# Patient Record
Sex: FEMALE | HISPANIC OR LATINO | Employment: FULL TIME | URBAN - NONMETROPOLITAN AREA
[De-identification: names, ages, dates, MRNs, and addresses within clinical notes are randomized per-mention and may not be internally consistent; named-entity substitution may affect disease eponyms.]

---

## 2021-03-08 ENCOUNTER — OFFICE VISIT (OUTPATIENT)
Dept: URGENT CARE | Facility: CLINIC | Age: 26
End: 2021-03-08
Payer: COMMERCIAL

## 2021-03-08 VITALS
HEIGHT: 64 IN | TEMPERATURE: 98.5 F | OXYGEN SATURATION: 99 % | HEART RATE: 79 BPM | BODY MASS INDEX: 32.95 KG/M2 | WEIGHT: 193 LBS | SYSTOLIC BLOOD PRESSURE: 104 MMHG | DIASTOLIC BLOOD PRESSURE: 64 MMHG | RESPIRATION RATE: 16 BRPM

## 2021-03-08 DIAGNOSIS — M54.50 LOW BACK PAIN WITH RADIATION: ICD-10-CM

## 2021-03-08 PROCEDURE — 99203 OFFICE O/P NEW LOW 30 MIN: CPT | Performed by: PHYSICIAN ASSISTANT

## 2021-03-08 RX ORDER — KETOROLAC TROMETHAMINE 30 MG/ML
30 INJECTION, SOLUTION INTRAMUSCULAR; INTRAVENOUS ONCE
Status: COMPLETED | OUTPATIENT
Start: 2021-03-08 | End: 2021-03-08

## 2021-03-08 RX ORDER — METHYLPREDNISOLONE 4 MG/1
TABLET ORAL
Qty: 21 TABLET | Refills: 0 | Status: SHIPPED | OUTPATIENT
Start: 2021-03-08 | End: 2021-07-26

## 2021-03-08 RX ORDER — CYCLOBENZAPRINE HCL 10 MG
10 TABLET ORAL 3 TIMES DAILY PRN
Qty: 30 TABLET | Refills: 0 | Status: SHIPPED | OUTPATIENT
Start: 2021-03-08 | End: 2021-07-26

## 2021-03-08 RX ADMIN — KETOROLAC TROMETHAMINE 30 MG: 30 INJECTION, SOLUTION INTRAMUSCULAR; INTRAVENOUS at 17:50

## 2021-03-08 ASSESSMENT — ENCOUNTER SYMPTOMS
MYALGIAS: 1
FEVER: 0
BACK PAIN: 1
WEAKNESS: 0
FALLS: 0
HEADACHES: 0
TINGLING: 0
CHILLS: 0
DIAPHORESIS: 0
PALPITATIONS: 0
DIZZINESS: 0

## 2021-03-08 NOTE — LETTER
[facility] URGENT CARE [facility]  [address] [address]     March 8, 2021    Patient: Thi Rubalcava   YOB: 1995   Date of Visit: 3/8/2021       To Whom It May Concern:    Thi Rubalcava was seen and treated in our department on 3/8/2021.  Please excuse from work on 3/9/2021 and 3/10/2021.  Patient sustained a low back strain with associated radiculopathy.  Please call with any questions or concerns at [phone].    Sincerely,     Alex Valadez P.A.-C.

## 2021-03-09 NOTE — PROGRESS NOTES
Subjective:   Thi Rubalcava is a 25 y.o. female who presents for Low Back Pain (excruciating pain on her lower back, no known injury happened 2 days ago)      HPI:  This is a very pleasant 25-year-old female presenting to the clinic with right-sided low back pain starting on Saturday.  Patient states she was vacuuming her house when she felt an immediate sharp twinge and has had continued pain.  She denies any direct trauma or fall.  Pain is described as a sharp stabbing pain to the right lower back and radicular symptoms down the right leg.  Lumbar flexion seem to aggravate the symptoms.  She denies any numbness or tingling down the legs.  She denies any fevers, chills or myalgias.  Denies any saddle paresthesias.  Denies any change in bowel or bladder function.  She has tried Tylenol and ibuprofen which provides mild improvement.     Review of Systems   Constitutional: Negative for chills, diaphoresis, fever and malaise/fatigue.   Cardiovascular: Negative for chest pain and palpitations.   Genitourinary: Negative for dysuria and frequency.   Musculoskeletal: Positive for back pain and myalgias. Negative for falls.   Neurological: Negative for dizziness, tingling, weakness and headaches.       Medications:    • ibuprofen Tabs  • PRENATAL COMPLETE PO    Allergies: Patient has no known allergies.    Problem List: Thi Rubalcava has Hx of diet controlled gestational diabetes mellitus in second trimester and Encounter for postpartum care of lactating mother on their problem list.    Surgical History:  No past surgical history on file.    Past Social Hx: Thi Rubalcava  reports that she has never smoked. She has never used smokeless tobacco. She reports that she does not drink alcohol and does not use drugs.     Past Family Hx:  Thi Rubalcava family history includes Cancer in her brother and paternal grandfather; Diabetes in her father, paternal aunt, paternal grandmother, and paternal  "uncle; Hypertension in her paternal aunt, paternal grandmother, and paternal uncle.     Problem list, medications, and allergies reviewed by myself today in Epic.     Objective:     /64   Pulse 79   Temp 36.9 °C (98.5 °F)   Resp 16   Ht 1.626 m (5' 4\")   Wt 87.5 kg (193 lb)   LMP 08/09/2015 (Exact Date)   SpO2 99%   BMI 33.13 kg/m²     Physical Exam  Constitutional:       General: She is not in acute distress.     Appearance: Normal appearance. She is not ill-appearing, toxic-appearing or diaphoretic.   HENT:      Head: Normocephalic and atraumatic.   Eyes:      General:         Left eye: Left eye discharge: medrol.     Conjunctiva/sclera: Conjunctivae normal.   Cardiovascular:      Rate and Rhythm: Normal rate and regular rhythm.      Pulses: Normal pulses.      Heart sounds: Normal heart sounds.   Musculoskeletal:      Cervical back: Normal range of motion.      Comments: Lumbar exam:  No midline tenderness to palpation.  No obvious deformities or step-offs.  Mild right-sided paraspinal muscle tenderness to palpation.  Patient has limited lumbar range of motion in all directions due to pain.  Positive right-sided straight leg raise at 10 degrees.  Bilateral lower extremity sensation intact.  Normal gait.   Neurological:      General: No focal deficit present.      Mental Status: She is alert and oriented to person, place, and time. Mental status is at baseline.           Assessment/Plan:     Diagnosis and associated orders:   1. Low back pain with radiation    - methylPREDNISolone (MEDROL DOSEPAK) 4 MG Tablet Therapy Pack; Follow schedule on package instructions.  Dispense: 21 tablet; Refill: 0  - cyclobenzaprine (FLEXERIL) 10 mg Tab; Take 1 tablet by mouth 3 times a day as needed.  Dispense: 30 tablet; Refill: 0  - ketorolac (TORADOL) injection 30 mg       Comments/MDM:     Take medication as prescribed. Discussed sedative effects of muscle relaxers.  Can take OTC Tylenol as directed for pain. "   Temperature Therapy: Heat or ice, whichever feels better.  Gentle ROM back stretches and exercises. Resume activity as tolerated.  Follow up with your primary care doctor.  • Follow up for persistent, new, or worsening pain. Emergently for weakness, loss of bowel or bladder, groin pain or numbness, fevers.           Differential diagnosis, natural history, supportive care, and indications for immediate follow-up discussed.    Advised the patient to follow-up with the primary care physician for recheck, reevaluation, and consideration of further management.    Please note that this dictation was created using voice recognition software. I have made reasonable attempt to correct obvious errors, but I expect that there are errors of grammar and possibly content that I did not discover before finalizing the note.    This note was electronically signed by YING Valadez PA-C

## 2021-07-26 ENCOUNTER — OFFICE VISIT (OUTPATIENT)
Dept: URGENT CARE | Facility: CLINIC | Age: 26
End: 2021-07-26
Payer: COMMERCIAL

## 2021-07-26 VITALS
DIASTOLIC BLOOD PRESSURE: 60 MMHG | HEART RATE: 88 BPM | WEIGHT: 182 LBS | HEIGHT: 64 IN | SYSTOLIC BLOOD PRESSURE: 102 MMHG | TEMPERATURE: 98.8 F | RESPIRATION RATE: 16 BRPM | BODY MASS INDEX: 31.07 KG/M2 | OXYGEN SATURATION: 94 %

## 2021-07-26 DIAGNOSIS — G44.89 OTHER HEADACHE SYNDROME: ICD-10-CM

## 2021-07-26 DIAGNOSIS — R09.81 NASAL CONGESTION: ICD-10-CM

## 2021-07-26 DIAGNOSIS — J02.9 SORE THROAT: ICD-10-CM

## 2021-07-26 DIAGNOSIS — B34.9 VIRAL ILLNESS: ICD-10-CM

## 2021-07-26 LAB
INT CON NEG: NEGATIVE
INT CON POS: POSITIVE
S PYO AG THROAT QL: NEGATIVE

## 2021-07-26 PROCEDURE — 87880 STREP A ASSAY W/OPTIC: CPT | Performed by: NURSE PRACTITIONER

## 2021-07-26 PROCEDURE — 99213 OFFICE O/P EST LOW 20 MIN: CPT | Performed by: NURSE PRACTITIONER

## 2021-07-26 ASSESSMENT — ENCOUNTER SYMPTOMS
GASTROINTESTINAL NEGATIVE: 1
CARDIOVASCULAR NEGATIVE: 1
PSYCHIATRIC NEGATIVE: 1
SORE THROAT: 1
TROUBLE SWALLOWING: 1
CONSTITUTIONAL NEGATIVE: 1
EYES NEGATIVE: 1
MUSCULOSKELETAL NEGATIVE: 1
RESPIRATORY NEGATIVE: 1
HEADACHES: 1
SWOLLEN GLANDS: 1

## 2021-07-26 NOTE — LETTER
July 26, 2021    To Whom It May Concern:         This is confirmation that Thi Rubalcava attended her scheduled appointment with BRIANA Plata on 7/26/21.         If you have any questions please do not hesitate to call me at the phone number listed below.    Sincerely,          Pretty Cabrera, Med Ass't  [phone]

## 2021-07-26 NOTE — LETTER
July 26, 2021    To Whom It May Concern:         This is confirmation that Thi Rubalcava attended her scheduled appointment with BRIANA Plata on 7/26/21. Please excuse her from work on the dates of 7/26 and 7/27.         If you have any questions please do not hesitate to call me at the phone number listed below.    Sincerely,          RAMONA PlataRSujathaN.  [phone]

## 2021-07-27 NOTE — PROGRESS NOTES
"Subjective:   Thi Rubalcava is a 25 y.o. female who presents for Pharyngitis (X 1 day), Nasal Congestion (X 1 day), and Headache (X 1 day)       Pharyngitis   This is a new problem. The current episode started today. The problem has been unchanged. The pain is worse on the right side. There has been no fever. The pain is moderate. Associated symptoms include congestion, headaches, swollen glands and trouble swallowing. She has tried NSAIDs and cool liquids (hot tea) for the symptoms. The treatment provided moderate relief.         Review of Systems   Constitutional: Negative.    HENT: Positive for congestion, sore throat and trouble swallowing.    Eyes: Negative.    Respiratory: Negative.    Cardiovascular: Negative.    Gastrointestinal: Negative.    Genitourinary: Negative.    Musculoskeletal: Negative.    Skin: Negative.    Neurological: Positive for headaches.   Psychiatric/Behavioral: Negative.    All other systems reviewed and are negative.      MEDS: No current outpatient medications on file.  ALLERGIES: No Known Allergies    Patient's PMH, SocHx, SurgHx, FamHx, Drug allergies and medications were reviewed.     Objective:   /60 (BP Location: Right arm, Patient Position: Sitting, BP Cuff Size: Adult)   Pulse 88   Temp 37.1 °C (98.8 °F) (Temporal)   Resp 16   Ht 1.626 m (5' 4\")   Wt 82.6 kg (182 lb)   LMP 08/09/2015 (Exact Date)   SpO2 94%   BMI 31.24 kg/m²     Physical Exam  Vitals and nursing note reviewed.   Constitutional:       General: She is awake.      Appearance: Normal appearance. She is well-developed and normal weight.   HENT:      Head: Normocephalic and atraumatic.      Right Ear: Tympanic membrane, ear canal and external ear normal.      Left Ear: Tympanic membrane, ear canal and external ear normal.      Nose: Nose normal.      Mouth/Throat:      Lips: Pink.      Mouth: Mucous membranes are moist.      Pharynx: Oropharynx is clear. Uvula midline.   Eyes:      Extraocular " Movements: Extraocular movements intact.      Conjunctiva/sclera: Conjunctivae normal.      Pupils: Pupils are equal, round, and reactive to light.   Neck:      Thyroid: No thyromegaly.      Trachea: Trachea normal.   Cardiovascular:      Rate and Rhythm: Normal rate and regular rhythm.      Pulses: Normal pulses.      Heart sounds: Normal heart sounds, S1 normal and S2 normal.   Pulmonary:      Effort: Pulmonary effort is normal. No respiratory distress.      Breath sounds: Normal breath sounds. No wheezing, rhonchi or rales.   Abdominal:      General: Bowel sounds are normal.      Palpations: Abdomen is soft.   Musculoskeletal:         General: Normal range of motion.      Cervical back: Full passive range of motion without pain, normal range of motion and neck supple.   Lymphadenopathy:      Cervical: No cervical adenopathy.   Skin:     General: Skin is warm and dry.      Capillary Refill: Capillary refill takes less than 2 seconds.   Neurological:      General: No focal deficit present.      Mental Status: She is alert and oriented to person, place, and time.      Gait: Gait is intact.   Psychiatric:         Attention and Perception: Attention and perception normal.         Mood and Affect: Mood normal.         Speech: Speech normal.         Behavior: Behavior normal. Behavior is cooperative.         Thought Content: Thought content normal.         Judgment: Judgment normal.         Assessment/Plan:   Assessment    1. Viral illness, suspected    2. Sore throat  - POCT Rapid Strep A    3. Nasal congestion  - POCT Rapid Strep A    4. Other headache syndrome  - POCT Rapid Strep A    Vital signs stable at today's acute urgent care visit. Reviewed test results that were completed in the clinic, negative for group A strep.  Patient is fully covered vaccinated, low suspicion for Covid at this time.  Discussed management options (risks, benefits, and alternatives to treatment).     Advised the patient to follow-up with  the primary care provider for recheck, reevaluation, and/or consideration of further management if necessary. Return to urgent care with any worsening symptoms or if there is no improvement in their current condition. Red flags discussed and indications to immediately call 911 or present to the ED.  All questions were encouraged and answered to the patient's satisfaction and understanding, and they agree to the plan of care.     I personally reviewed prior external notes and test results pertinent to today's visit.  I have independently reviewed and interpreted all diagnostics ordered during this urgent care acute visit. Time spent evaluating this patient was a minimum of 30 minutes and includes preparing for visit, counseling/education, exam, evaluation, obtaining history, and ordering lab/test/procedures.      Please note that this dictation was created using voice recognition software. I have made a reasonable attempt to correct obvious errors, but I expect that there are errors of grammar and possibly content that I did not discover before finalizing the note.

## 2021-07-29 ENCOUNTER — HOSPITAL ENCOUNTER (OUTPATIENT)
Facility: MEDICAL CENTER | Age: 26
End: 2021-07-29
Attending: PHYSICIAN ASSISTANT
Payer: COMMERCIAL

## 2021-07-29 ENCOUNTER — OFFICE VISIT (OUTPATIENT)
Dept: URGENT CARE | Facility: CLINIC | Age: 26
End: 2021-07-29
Payer: COMMERCIAL

## 2021-07-29 VITALS
HEIGHT: 64 IN | TEMPERATURE: 97.9 F | SYSTOLIC BLOOD PRESSURE: 112 MMHG | BODY MASS INDEX: 31.02 KG/M2 | OXYGEN SATURATION: 95 % | DIASTOLIC BLOOD PRESSURE: 68 MMHG | HEART RATE: 79 BPM | RESPIRATION RATE: 16 BRPM | WEIGHT: 181.7 LBS

## 2021-07-29 DIAGNOSIS — R05.9 COUGH: ICD-10-CM

## 2021-07-29 DIAGNOSIS — J98.8 VIRAL RESPIRATORY ILLNESS: ICD-10-CM

## 2021-07-29 DIAGNOSIS — B97.89 VIRAL RESPIRATORY ILLNESS: ICD-10-CM

## 2021-07-29 PROCEDURE — 99214 OFFICE O/P EST MOD 30 MIN: CPT | Performed by: PHYSICIAN ASSISTANT

## 2021-07-29 PROCEDURE — U0003 INFECTIOUS AGENT DETECTION BY NUCLEIC ACID (DNA OR RNA); SEVERE ACUTE RESPIRATORY SYNDROME CORONAVIRUS 2 (SARS-COV-2) (CORONAVIRUS DISEASE [COVID-19]), AMPLIFIED PROBE TECHNIQUE, MAKING USE OF HIGH THROUGHPUT TECHNOLOGIES AS DESCRIBED BY CMS-2020-01-R: HCPCS

## 2021-07-29 RX ORDER — BENZONATATE 100 MG/1
200 CAPSULE ORAL 3 TIMES DAILY PRN
Qty: 60 CAPSULE | Refills: 0 | Status: SHIPPED | OUTPATIENT
Start: 2021-07-29

## 2021-07-29 ASSESSMENT — ENCOUNTER SYMPTOMS
VOMITING: 0
COUGH: 1
SPUTUM PRODUCTION: 0
SHORTNESS OF BREATH: 1
DIARRHEA: 0
FEVER: 0
CHILLS: 0
NAUSEA: 0
MYALGIAS: 1
SORE THROAT: 1
WEAKNESS: 1
WHEEZING: 0

## 2021-07-29 NOTE — LETTER
July 29, 2021         Patient: Thi Rubalcava   YOB: 1995   Date of Visit: 7/29/2021           To Whom it May Concern:    Concern for COVID-19 illness has been identified and testing is in progress.  The results are available through our electronic delivery system called [email or web address].    We are asking employers to excuse absence while they follow self isolation protocol per CDC guidelines    • At least 10 days since symptoms first appeared AND  • At least 24 hours with no fever greater than 100.4 without fever reducing medication AND  • Symptoms have improved.     If results are negative, you must continue to follow the self-isolation protocol. You may return to work when you have no fever for at least 24 hours without the use of fever-reducing medication, and symptoms have improved.     If the results of testing are positive then you will be contacted by your [facility] department for further instructions on duration of self-isolation and return to work. In general, this will also follow the CDC guidelines with minimum of 10 days from the onset of symptoms, and symptoms are improving without fever.       This is the only note that will be provided from [facility] for this visit. Please schedule a visit with a primary care provider if FMLA, disability, or unemployment paperwork is required.       Sincerely,    Xuan Montoya, P.A.-C.  [phone]            Electronically Signed

## 2021-07-30 NOTE — PATIENT INSTRUCTIONS
INSTRUCTIONS FOR COVID-19 OR ANY OTHER INFECTIOUS RESPIRATORY ILLNESSES    The Centers for Disease Control and Prevention (CDC) states that early indications for COVID-19 include cough, shortness of breath, difficulty breathing, or at least two of the following symptoms: chills, shaking with chills, muscle pain, headache, sore throat, and loss of taste or smell. Symptoms can range from mild to severe and may appear up to two weeks after exposure to the virus.    The practice of self-isolation and quarantine helps protect the public and your family by  preventing exposure to people who have or may have a contagious disease. Please follow the prevention steps below as based on CDC guidelines:    WHEN TO STOP ISOLATION: Persons with COVID-19 or any other infectious respiratory illness who have symptoms and were advised to care for themselves at home may discontinue home isolation under the following conditions:  · At least 24 hours have passed since recovery defined as resolution of fever without the use of fever-reducing medications; AND,  · Improvement in respiratory symptoms (e.g., cough, shortness of breath); AND,  · At least 10 days have passed since symptoms first appeared and have had no subsequent illness.    MONITOR YOUR SYMPTOMS: If your illness is worsening, seek prompt medical attention. If you have a medical emergency and need to call 911, notify the dispatch personnel that you have, or are being evaluated for confirmed or suspected COVID-19 or another infectious respiratory illness. Wear a facemask if possible.    ACTIVITY RESTRICTION: restrict activities outside your home, except for getting medical care. Do not go to work, school, or public areas. Avoid using public transportation, ride-sharing, or taxis.    SCHEDULED MEDICAL APPOINTMENTS: Notify your provider that you have, or are being evaluated for, confirmed or suspected COVID-19 or another infectious respiratory. This will help the healthcare  provider’s office safely take care of you and keep other people from getting exposed or infected.    FACEMASKS, when to wear: Anytime you are away from your home or around other people or pets. If you are unable to wear one, maintain a minimum of 6 feet distancing from others.    LIVING ENVIRONMENT: Stay in a separate room from other people and pets. If possible, use a separate bathroom, have someone else care for your pets and avoid sharing household items. Any items used should be washed thoroughly with soap and water. Clean all “high-touch” surfaces every day. Use a household cleaning spray or wipe, according to the label instructions. High touch surfaces include (but are not limited to) counters, tabletops, doorknobs, bathroom fixtures, toilets, phones, keyboards, tablets, and bedside tables.     HAND WASHING: Frequently wash hands with soap and water for at least 20 seconds,  especially after blowing your nose, coughing, or sneezing; going to the bathroom; before and after interacting with pets; and before and after eating or preparing food. If hands are visibly dirty use soap and water. If soap and water are not available, use an alcohol-based hand  with at least 60% alcohol. Avoid touching your eyes, nose, and mouth with unwashed hands. Cover your coughs and sneezes with a tissue. Throw used tissues in a lined trash can. Immediately wash your hands.    ACTIVE/FACILITATED SELF-MONITORING: Follow instructions provided by your local health department or health professionals, as appropriate. When working with your local health department check their available hours.    [facility]   Phone Number   [facility] [phone]   [facility] [place] [phone]   [place] [address]   [place] [phone]     IF YOU HAVE CONFIRMED POSITIVE COVID-19:    Those who have completely recovered from COVID-19 may have immune-boosting antibodies in their plasma--called “convalescent plasma”--that could be  used to treat critically ill COVID19 patients.    [facility] is excited to begin working with [facility] on collecting convalescent plasma from  people who have recovered from COVID-19 as part of a program to treat patients infected with the virus. This FDA-approved “emergency investigational new drug” is a special blood product containing antibodies that may give patients an extra boost to fight the virus.    To be eligible to donate convalescent plasma, you must have a prior COVID-19 diagnosis documented by a laboratory test (or a positive test result for SARS-CoV-2 antibodies) and meet additional eligibility requirements.    If you are interested in donating convalescent plasma or have any additional questions, please contact the [facility] Convalescent Plasma  at [phone] or via e-mail at [email or web address].

## 2021-07-31 DIAGNOSIS — B97.89 VIRAL RESPIRATORY ILLNESS: ICD-10-CM

## 2021-07-31 DIAGNOSIS — R05.9 COUGH: ICD-10-CM

## 2021-07-31 DIAGNOSIS — J98.8 VIRAL RESPIRATORY ILLNESS: ICD-10-CM

## 2021-07-31 LAB — COVID ORDER STATUS COVID19: NORMAL

## 2021-08-01 LAB
SARS-COV-2 RNA RESP QL NAA+PROBE: NOTDETECTED
SPECIMEN SOURCE: NORMAL

## 2025-07-04 ENCOUNTER — APPOINTMENT (OUTPATIENT)
Dept: RADIOLOGY | Facility: MEDICAL CENTER | Age: 30
End: 2025-07-04
Attending: EMERGENCY MEDICINE

## 2025-07-04 ENCOUNTER — HOSPITAL ENCOUNTER (EMERGENCY)
Facility: MEDICAL CENTER | Age: 30
End: 2025-07-04
Attending: EMERGENCY MEDICINE

## 2025-07-04 VITALS
RESPIRATION RATE: 16 BRPM | HEIGHT: 63 IN | TEMPERATURE: 97 F | SYSTOLIC BLOOD PRESSURE: 119 MMHG | DIASTOLIC BLOOD PRESSURE: 71 MMHG | HEART RATE: 65 BPM | OXYGEN SATURATION: 96 % | BODY MASS INDEX: 32.54 KG/M2 | WEIGHT: 183.64 LBS

## 2025-07-04 DIAGNOSIS — R20.2 PARESTHESIA: Primary | ICD-10-CM

## 2025-07-04 DIAGNOSIS — G43.809 OTHER MIGRAINE WITHOUT STATUS MIGRAINOSUS, NOT INTRACTABLE: ICD-10-CM

## 2025-07-04 DIAGNOSIS — H53.9 VISUAL DISTURBANCE: ICD-10-CM

## 2025-07-04 LAB
ABO GROUP BLD: NORMAL
ALBUMIN SERPL BCP-MCNC: 4.1 G/DL (ref 3.2–4.9)
ALBUMIN/GLOB SERPL: 1.5 G/DL
ALP SERPL-CCNC: 45 U/L (ref 30–99)
ALT SERPL-CCNC: 22 U/L (ref 2–50)
ANION GAP SERPL CALC-SCNC: 11 MMOL/L (ref 7–16)
APTT PPP: 29.7 SEC (ref 24.7–36)
AST SERPL-CCNC: 17 U/L (ref 12–45)
BASOPHILS # BLD AUTO: 0.9 % (ref 0–1.8)
BASOPHILS # BLD: 0.06 K/UL (ref 0–0.12)
BILIRUB SERPL-MCNC: 0.3 MG/DL (ref 0.1–1.5)
BLD GP AB SCN SERPL QL: NORMAL
BUN SERPL-MCNC: 15 MG/DL (ref 8–22)
CALCIUM ALBUM COR SERPL-MCNC: 8.8 MG/DL (ref 8.5–10.5)
CALCIUM SERPL-MCNC: 8.9 MG/DL (ref 8.5–10.5)
CHLORIDE SERPL-SCNC: 106 MMOL/L (ref 96–112)
CO2 SERPL-SCNC: 20 MMOL/L (ref 20–33)
CREAT SERPL-MCNC: 0.82 MG/DL (ref 0.5–1.4)
EKG IMPRESSION: NORMAL
EOSINOPHIL # BLD AUTO: 0.08 K/UL (ref 0–0.51)
EOSINOPHIL NFR BLD: 1.2 % (ref 0–6.9)
ERYTHROCYTE [DISTWIDTH] IN BLOOD BY AUTOMATED COUNT: 42.2 FL (ref 35.9–50)
GFR SERPLBLD CREATININE-BSD FMLA CKD-EPI: 98 ML/MIN/1.73 M 2
GLOBULIN SER CALC-MCNC: 2.8 G/DL (ref 1.9–3.5)
GLUCOSE BLD STRIP.AUTO-MCNC: 93 MG/DL (ref 65–99)
GLUCOSE SERPL-MCNC: 84 MG/DL (ref 65–99)
HCG SERPL QL: NEGATIVE
HCT VFR BLD AUTO: 39.5 % (ref 37–47)
HGB BLD-MCNC: 13.7 G/DL (ref 12–16)
IMM GRANULOCYTES # BLD AUTO: 0.01 K/UL (ref 0–0.11)
IMM GRANULOCYTES NFR BLD AUTO: 0.1 % (ref 0–0.9)
INR PPP: 1.02 (ref 0.87–1.13)
LYMPHOCYTES # BLD AUTO: 2.46 K/UL (ref 1–4.8)
LYMPHOCYTES NFR BLD: 36.6 % (ref 22–41)
MCH RBC QN AUTO: 32.2 PG (ref 27–33)
MCHC RBC AUTO-ENTMCNC: 34.7 G/DL (ref 32.2–35.5)
MCV RBC AUTO: 92.7 FL (ref 81.4–97.8)
MONOCYTES # BLD AUTO: 0.5 K/UL (ref 0–0.85)
MONOCYTES NFR BLD AUTO: 7.4 % (ref 0–13.4)
NEUTROPHILS # BLD AUTO: 3.62 K/UL (ref 1.82–7.42)
NEUTROPHILS NFR BLD: 53.8 % (ref 44–72)
NRBC # BLD AUTO: 0 K/UL
NRBC BLD-RTO: 0 /100 WBC (ref 0–0.2)
PLATELET # BLD AUTO: 290 K/UL (ref 164–446)
PMV BLD AUTO: 10.4 FL (ref 9–12.9)
POTASSIUM SERPL-SCNC: 4.4 MMOL/L (ref 3.6–5.5)
PROT SERPL-MCNC: 6.9 G/DL (ref 6–8.2)
PROTHROMBIN TIME: 13.4 SEC (ref 12–14.6)
RBC # BLD AUTO: 4.26 M/UL (ref 4.2–5.4)
RH BLD: NORMAL
SODIUM SERPL-SCNC: 137 MMOL/L (ref 135–145)
TROPONIN T SERPL-MCNC: <6 NG/L (ref 6–19)
WBC # BLD AUTO: 6.7 K/UL (ref 4.8–10.8)

## 2025-07-04 PROCEDURE — 86850 RBC ANTIBODY SCREEN: CPT

## 2025-07-04 PROCEDURE — 99284 EMERGENCY DEPT VISIT MOD MDM: CPT

## 2025-07-04 PROCEDURE — 70498 CT ANGIOGRAPHY NECK: CPT

## 2025-07-04 PROCEDURE — 85610 PROTHROMBIN TIME: CPT

## 2025-07-04 PROCEDURE — 0042T CT-CEREBRAL PERFUSION ANALYSIS: CPT

## 2025-07-04 PROCEDURE — 70496 CT ANGIOGRAPHY HEAD: CPT

## 2025-07-04 PROCEDURE — 80053 COMPREHEN METABOLIC PANEL: CPT

## 2025-07-04 PROCEDURE — 700111 HCHG RX REV CODE 636 W/ 250 OVERRIDE (IP): Mod: JZ | Performed by: EMERGENCY MEDICINE

## 2025-07-04 PROCEDURE — 82962 GLUCOSE BLOOD TEST: CPT

## 2025-07-04 PROCEDURE — 71045 X-RAY EXAM CHEST 1 VIEW: CPT

## 2025-07-04 PROCEDURE — 85730 THROMBOPLASTIN TIME PARTIAL: CPT

## 2025-07-04 PROCEDURE — 85025 COMPLETE CBC W/AUTO DIFF WBC: CPT

## 2025-07-04 PROCEDURE — 86901 BLOOD TYPING SEROLOGIC RH(D): CPT

## 2025-07-04 PROCEDURE — 96374 THER/PROPH/DIAG INJ IV PUSH: CPT

## 2025-07-04 PROCEDURE — 86900 BLOOD TYPING SEROLOGIC ABO: CPT

## 2025-07-04 PROCEDURE — 93005 ELECTROCARDIOGRAM TRACING: CPT | Mod: TC | Performed by: EMERGENCY MEDICINE

## 2025-07-04 PROCEDURE — 84484 ASSAY OF TROPONIN QUANT: CPT

## 2025-07-04 PROCEDURE — 700117 HCHG RX CONTRAST REV CODE 255: Performed by: EMERGENCY MEDICINE

## 2025-07-04 PROCEDURE — 84703 CHORIONIC GONADOTROPIN ASSAY: CPT

## 2025-07-04 PROCEDURE — 96375 TX/PRO/DX INJ NEW DRUG ADDON: CPT

## 2025-07-04 PROCEDURE — 36415 COLL VENOUS BLD VENIPUNCTURE: CPT

## 2025-07-04 RX ORDER — DEXAMETHASONE SODIUM PHOSPHATE 4 MG/ML
10 INJECTION, SOLUTION INTRA-ARTICULAR; INTRALESIONAL; INTRAMUSCULAR; INTRAVENOUS; SOFT TISSUE ONCE
Status: COMPLETED | OUTPATIENT
Start: 2025-07-04 | End: 2025-07-04

## 2025-07-04 RX ORDER — DIPHENHYDRAMINE HYDROCHLORIDE 50 MG/ML
12.5 INJECTION, SOLUTION INTRAMUSCULAR; INTRAVENOUS ONCE
Status: COMPLETED | OUTPATIENT
Start: 2025-07-04 | End: 2025-07-04

## 2025-07-04 RX ORDER — METOCLOPRAMIDE HYDROCHLORIDE 5 MG/ML
10 INJECTION INTRAMUSCULAR; INTRAVENOUS ONCE
Status: COMPLETED | OUTPATIENT
Start: 2025-07-04 | End: 2025-07-04

## 2025-07-04 RX ADMIN — DEXAMETHASONE SODIUM PHOSPHATE 10 MG: 4 INJECTION, SOLUTION INTRAMUSCULAR; INTRAVENOUS at 12:53

## 2025-07-04 RX ADMIN — DIPHENHYDRAMINE HYDROCHLORIDE 12.5 MG: 50 INJECTION, SOLUTION INTRAMUSCULAR; INTRAVENOUS at 12:54

## 2025-07-04 RX ADMIN — METOCLOPRAMIDE HYDROCHLORIDE 10 MG: 5 INJECTION INTRAMUSCULAR; INTRAVENOUS at 12:58

## 2025-07-04 RX ADMIN — IOHEXOL 120 ML: 350 INJECTION, SOLUTION INTRAVENOUS at 14:00

## 2025-07-04 ASSESSMENT — LIFESTYLE VARIABLES
HAVE PEOPLE ANNOYED YOU BY CRITICIZING YOUR DRINKING: NO
EVER FELT BAD OR GUILTY ABOUT YOUR DRINKING: NO
CONSUMPTION TOTAL: INCOMPLETE
TOTAL SCORE: 0
HAVE YOU EVER FELT YOU SHOULD CUT DOWN ON YOUR DRINKING: NO
DO YOU DRINK ALCOHOL: NO
TOTAL SCORE: 0
TOTAL SCORE: 0
EVER HAD A DRINK FIRST THING IN THE MORNING TO STEADY YOUR NERVES TO GET RID OF A HANGOVER: NO

## 2025-07-04 NOTE — DISCHARGE INSTRUCTIONS
You are leaving the hospital today against physician advice as you are declining further evaluation with MRI scan to be sure that you did not have a small stroke.  You are welcome to return anytime if you change your mind about admission and completing the rest of the workup.    Please follow-up with your neurologist on Monday.  Call first thing Monday morning to schedule an appointment.    Return to the ER immediately for any recurrent vision changes, recurrent numbness or tingling to your extremities, weakness of your extremities, recurrent headache, double vision, vertigo, facial drooping, slurred speech, difficulty walking, chest pain, shortness of breath, or for any concerns.    Drink plenty of fluids to stay well-hydrated.

## 2025-07-04 NOTE — ED PROVIDER NOTES
"ED Provider Note    CHIEF COMPLAINT  Chief Complaint   Patient presents with    Numbness     Patient presents for left monocular vision changes as well as numbness and tingling to left arm. Seen three years ago for same complaint with diagnosis of migraines.        EXTERNAL RECORDS REVIEWED  External ED Note patient was seen at [facility] ER September 8, 2022 complaining of left eye vision loss,and left upper extremity weakness.  She reported some tingling to the entire left arm from the left shoulder down to her fingers.  During that visit patient reported a frontal headache which was similar to her migraine headaches.  She underwent a teleneurology visit and plan was to proceed with CT/CTA and MRI.  The patient was given a migraine cocktail with significant improvement. The CTA of the head and neck done at [facility] during that ER visit was negative.  She also had an MRI of the brain performed during that ER visit and the MRI was read as  \"no acute intracranial abnormalities.\"  Patient was discharged from the ER with a diagnosis of suspected complex migraine headache.    Patient followed up with Dr. Phillips, neurologist, in December 2022 and again in March 2023.  Neurologist felt that the patient's transient loss of vision on the left and left sided weakness and numbness was likely related to a hemiplegic migraine.  She reported history of migraine headaches in the past.  It was noted that her neuroimaging was reassuring.  He recommended Elavil as preventative, 10 mg nightly.  She was also given sample of Ubrelvy for breakthrough headache.  When patient was seen in March 2023, she reported significant improvement in her migraines.    HPI/ROS  LIMITATION TO HISTORY   Select: : None  OUTSIDE HISTORIAN(S):  Significant other at bedside but does not provide any additional history.    Thi Rubalcava is a 29 y.o. female who presents to the ER with complaints of a sudden onset of some black-and-white tremors " "and squiggles which she saw out of the lateral side of her left eye.  She then started realizing that she had \"lost vision\" in her left eye.  The visual disturbance occurred around 10 AM this morning.  She said that when she looked at her son and looked at her significant other she could not see them out of the left eye.  About 10 minutes later she started noticing a numbness and tingling to the left arm and left leg.  She also felt like her left arm would not work very well.  She felt like her left leg was working.  The visual disturbance resolved.  The tingling to the left leg resolved.  She is still left with a little bit of tingling to the left fingertips, but overall the weakness and numbness to the majority of the left arm has resolved.  She said that shortly after developing the left arm and left leg symptoms, she developed a headache in the right side of her head.  She said when the headache started it felt like a  typical migraine.  However, she said her typical migraines are often \"behind the eye.  This headache was a little further up on her head than they normally are.  However, the headache is unilateral.  She described photophobia.  The patient said the symptoms were reminiscent of those that she had back in 2022 when she \"had a stroke.\"  She said she was out of work for 3 months because of this \"stroke.\"  Review of records reveal she had a normal MRI scan done in 2022 after she presented to [facility] with very similar symptoms.  No evidence of stroke on MRI scan.  The patient followed with Dr. Phillips, neurologist, following this visit to [facility] and 2022.  Neurologist diagnosed her with hemiplegic migraines after that visit to [facility] in September 2022 when she presented with very similar symptoms to the symptoms which she is describing today.  Neurologist had placed her on some medications for migraine headache.  She says she was told in 2024 that she did not need these " "medications anymore because her headaches were doing so well.   Currently the patient says her vision is back to normal.  She still has a right sided headache.  She took some Tylenol at home but that did not help the headache.  Other than the residual tingling in the left fingers, however, everything else has resolved.  No other complaint of numbness, tingling or weakness of her extremities at this time.  No speech deficits or disturbances today.  Vision is back to normal.  Patient denies possibility of pregnancy as she had a tubal ligation.    PAST MEDICAL HISTORY   has a past medical history of Asthma (2015).    SURGICAL HISTORY  patient denies any surgical history    FAMILY HISTORY  Family History   Problem Relation Age of Onset    Diabetes Paternal Grandmother     Hypertension Paternal Grandmother     Cancer Paternal Grandfather         lung cancer.    Diabetes Father         IDDM    Cancer Brother         testicular    Diabetes Paternal Aunt     Hypertension Paternal Aunt     Diabetes Paternal Uncle     Hypertension Paternal Uncle        SOCIAL HISTORY  Social History     Tobacco Use    Smoking status: Never    Smokeless tobacco: Never   Vaping Use    Vaping status: Never Used   Substance and Sexual Activity    Alcohol use: Yes     Comment: OCC    Drug use: Yes     Types: Marijuana, Inhaled     Comment: OCC    Sexual activity: Yes     Partners: Male       CURRENT MEDICATIONS  Home Medications       Reviewed by Alicia Tinajero R.N. (Registered Nurse) on 07/04/25 at 1111  Med List Status: Partial     Medication Last Dose Status   benzonatate (TESSALON) 100 MG Cap  Active                    ALLERGIES  Allergies[1]    PHYSICAL EXAM  VITAL SIGNS: /71   Pulse 65   Temp 36.1 °C (97 °F) (Temporal)   Resp 16   Ht 1.6 m (5' 3\")   Wt 83.3 kg (183 lb 10.3 oz)   LMP 08/09/2015 (Exact Date)   SpO2 96%   BMI 32.53 kg/m²    Constitutional:  Well developed, well nourished; No acute distress   HENT: Normocephalic, " Atraumatic, Bilateral external ears normal, Oropharynx moist, No erythema or exudates in posterior oropharynx.   Eyes: PERRL, EOMI, Conjunctiva normal, No discharge.   Neck: Normal range of motion, supple, nontender  Lymphatic: No lymphadenopathy noted.   Cardiovascular: Normal heart rate, Normal rhythm, No murmurs, rubs or gallops   Thorax & Lungs: CTA=bilaterally;  No respiratory distress,  No wheezing rales, or rhonchi; No chest tenderness. No crepitus or subQ air  Abdomen: soft, good bowel sounds, no guarding no rebound, no masses, no pulsatile mass, no tenderness, no distention  Skin: Warm, Dry, No erythema, No rash.   Back: No tenderness, No CVA tenderness.   Extremities: 2+ dp and pt pulses bilateral LEs;  Nontender; no pretibial edema  Neurologic: Alert & oriented x 4, clear speech, no drift of upper or lower extremities.   are 5 out of 5 and equal bilaterally.  Lower extremity strength are 5 out of 5 and equal to testing of dorsiflexors and plantar flexors as well as testing of quadriceps and hamstrings.  Sensation is intact to light touch bilateral lower extremities.  Slightly decreased sensation to light touch to the left arm when compared to the right arm.  Cranial nerves II through XII are intact.  Extraocular movements are intact without nystagmus.  Psychiatric: appropriate, normal affect     EKG/LABS  Results for orders placed or performed during the hospital encounter of 07/04/25   POCT glucose device results    Collection Time: 07/04/25 11:13 AM   Result Value Ref Range    POC Glucose, Blood 93 65 - 99 mg/dL   CBC WITH DIFFERENTIAL    Collection Time: 07/04/25  1:00 PM   Result Value Ref Range    WBC 6.7 4.8 - 10.8 K/uL    RBC 4.26 4.20 - 5.40 M/uL    Hemoglobin 13.7 12.0 - 16.0 g/dL    Hematocrit 39.5 37.0 - 47.0 %    MCV 92.7 81.4 - 97.8 fL    MCH 32.2 27.0 - 33.0 pg    MCHC 34.7 32.2 - 35.5 g/dL    RDW 42.2 35.9 - 50.0 fL    Platelet Count 290 164 - 446 K/uL    MPV 10.4 9.0 - 12.9 fL     Neutrophils-Polys 53.80 44.00 - 72.00 %    Lymphocytes 36.60 22.00 - 41.00 %    Monocytes 7.40 0.00 - 13.40 %    Eosinophils 1.20 0.00 - 6.90 %    Basophils 0.90 0.00 - 1.80 %    Immature Granulocytes 0.10 0.00 - 0.90 %    Nucleated RBC 0.00 0.00 - 0.20 /100 WBC    Neutrophils (Absolute) 3.62 1.82 - 7.42 K/uL    Lymphs (Absolute) 2.46 1.00 - 4.80 K/uL    Monos (Absolute) 0.50 0.00 - 0.85 K/uL    Eos (Absolute) 0.08 0.00 - 0.51 K/uL    Baso (Absolute) 0.06 0.00 - 0.12 K/uL    Immature Granulocytes (abs) 0.01 0.00 - 0.11 K/uL    NRBC (Absolute) 0.00 K/uL   COMP METABOLIC PANEL    Collection Time: 07/04/25  1:00 PM   Result Value Ref Range    Sodium 137 135 - 145 mmol/L    Potassium 4.4 3.6 - 5.5 mmol/L    Chloride 106 96 - 112 mmol/L    Co2 20 20 - 33 mmol/L    Anion Gap 11.0 7.0 - 16.0    Glucose 84 65 - 99 mg/dL    Bun 15 8 - 22 mg/dL    Creatinine 0.82 0.50 - 1.40 mg/dL    Calcium 8.9 8.5 - 10.5 mg/dL    Correct Calcium 8.8 8.5 - 10.5 mg/dL    AST(SGOT) 17 12 - 45 U/L    ALT(SGPT) 22 2 - 50 U/L    Alkaline Phosphatase 45 30 - 99 U/L    Total Bilirubin 0.3 0.1 - 1.5 mg/dL    Albumin 4.1 3.2 - 4.9 g/dL    Total Protein 6.9 6.0 - 8.2 g/dL    Globulin 2.8 1.9 - 3.5 g/dL    A-G Ratio 1.5 g/dL   PROTHROMBIN TIME    Collection Time: 07/04/25  1:00 PM   Result Value Ref Range    PT 13.4 12.0 - 14.6 sec    INR 1.02 0.87 - 1.13   APTT    Collection Time: 07/04/25  1:00 PM   Result Value Ref Range    APTT 29.7 24.7 - 36.0 sec   COD (ADULT)    Collection Time: 07/04/25  1:00 PM   Result Value Ref Range    ABO Grouping Only A     Rh Grouping Only POS     Antibody Screen-Cod NEG    TROPONIN    Collection Time: 07/04/25  1:00 PM   Result Value Ref Range    Troponin T <6 6 - 19 ng/L   HCG QUAL SERUM    Collection Time: 07/04/25  1:00 PM   Result Value Ref Range    Beta-Hcg Qualitative Serum Negative Negative   ESTIMATED GFR    Collection Time: 07/04/25  1:00 PM   Result Value Ref Range    GFR (CKD-EPI) 98 >60 mL/min/1.73 m 2   EKG  (NOW)    Collection Time: 25  5:30 PM   Result Value Ref Range    Report       [facility] Emergency Dept.    Test Date:  2025  Pt Name:    JASMINE COSME              Department: ER  MRN:        [id number]                      Room:       [facility]  Gender:     Female                       Technician: [id number]  :        1995                   Requested By:REGULO GARCIA  Order #:    [id number]                    Reading MD: Regulo Garcia    Measurements  Intervals                                Axis  Rate:       56                           P:          53  NY:         136                          QRS:        89  QRSD:       89                           T:          27  QT:         415  QTc:        401    Interpretive Statements  Sinus bradycardia rate 56  Normal axis  Normal intervals  No ST elevation or depression  No previous ECG available for comparison  Electronically Signed On 2025 17:30:29 PDT by Regulo Garcia        I have independently interpreted this EKG    RADIOLOGY/PROCEDURES   I have independently interpreted the diagnostic imaging associated with this visit and am waiting the final reading from the radiologist.     My preliminary interpretation is as follows: MD is reviewed the patient's chest x-ray.  No obvious infiltrates.    Radiologist interpretation:  CT-CEREBRAL PERFUSION ANALYSIS   Final Result      1. Cerebral blood flow less than 30% possibly representing completed infarct = 0 mL. Based on distribution of this finding, this is unlikely to represent artifact.      2. T Max more than 6 seconds possibly representing combination of completed infarct and ischemia = 0 mL. Based on the distribution of this finding, this is unlikely to represent artifact.      3. Mismatched volume possibly representing ischemic brain/penumbra= 0 mL      4.  Please note that this cerebral perfusion study and report is Quantitative and targets supratentorial (cerebral) perfusion for  "evaluation of large vessel territory acute ischemia/infarction. For example, lacunar infarcts, and brainstem/posterior fossa    ischemia/infarction are not evaluated on this study.  Data acquisition is subject to artifacts which can yield non-anatomically plausible perfusion maps which may be due to motion, bolus timing, signal to noise ratio, or other technical factors.    Perfusion map abnormalities which show non-anatomic distributions are likely artifact.   This study is not \"stand-alone\" and should only be utilized for diagnosis, management/treatment in correlation with CT, CTA, and/or MRI and clinical factors.         CT-CTA NECK WITH & W/O-POST PROCESSING   Final Result      1. No evidence of flow-limiting stenosis in the cervical carotid or cervical vertebral arteries.      CT-CTA HEAD WITH & W/O-POST PROCESS   Final Result      CT angiogram of the [place] within normal limits.      DX-CHEST-PORTABLE (1 VIEW)   Final Result      No acute cardiac or pulmonary abnormalities are identified.          COURSE & MEDICAL DECISION MAKING    ASSESSMENT, COURSE AND PLAN  Care Narrative: Patient presents to the ER complaining of some black-and-white squiggles in the periphery of her left eye which began around 10:00 this morning.  Shortly thereafter she noticed that she could not see her family members when she looked at them with her left eye.  About 10 minutes after the visual disturbance she then noticed a tingling and weakness of her left arm.  She also noted some tingling of her left leg.  Approximately 10 minutes after the tingling and weakness began she developed a right sided headache which felt very similar to her typical migraine headaches.  She has a long history of migraine headaches since she was a child.  The patient said when the headache began she felt like it was a typical migraine.  However, it was in a slightly different location (felt like it was up a little bit higher than her typical right " sided headaches.) the patient had a similar presentation back in September 2022 when she ended up in the ER at [facility] complaining of vision loss in the left eye, numbness and tingling to the left arm, and migraine headache.  At that time she underwent CT/CTA of the head and neck as well as an MRI scan of the brain which was negative for any acute abnormalities.  She was ultimately diagnosed with a complicated migraine headache.  She ended up following up with Dr. Phillips, neurologist at [facility] and was diagnosed with hemiplegic migraines.  The patient had been doing pretty well over the last year or so so she was no longer taking the medications prescribed by her neurologist.  Patient says that the visual disturbance today as well as the numbness and tingling in the left arm felt very similar to the symptoms that she had back in September 2022.  However, today she is describing some tingling in the left leg as well.  She did not have that in September 2022.  Upon arrival here to the ER all of her symptoms had resolved with the exception of a little bit of residual tingling in her left fingertips.  Other than describing some decrease sensation to light touch over the left arm, her neurologic exam was completely normal.  Again, her visual disturbance had resolved and her vision was back to normal.  The patient was complaining of a right sided headache.  She was given a migraine cocktail with complete resolution of the migraine.  She underwent CT/CTA of the head and neck and my plan was to admit her to the hospital for an MRI scan of the brain and TIA/CVA workup since she was describing some new tingling in her left leg today which was uncharacteristic of her previous episode and is relatively uncharacteristic for a complicated migraine.  However, patient does not want to stay.  She says it is 4 July and she has Fourth of July plans with her 9-year-old daughter.  She is feeling much better.   "All of her symptoms are resolved.  She feels that this is a similar event to what happened in 2022 when she was diagnosed with hemiplegic/complicated migraines.  She wants to go home.  She does not want to stay.  She is in a sign out AMA.  She has been invited to return if she changes her mind.  She has been asked to return immediately if she develops any recurrent symptoms.  She says she would rather just follow-up with her neurologist this coming week to see if he wants to do any further testing.  Patient has signed the AMA form in the form has been placed on the chart.    NIH stroke scale is: 1 for some residual decreased sensation in left arm     Upon reevaluation the patient says that her headache is completely gone.  The residual tingling which she still had upon initial evaluation is completely gone.  She has no visual change at this time.  Her left arm numbness, tingling weakness in her left leg tingling is completely gone.  I told the patient that the complaint of tingling to the left leg is a bit unusual for a hemiplegic migraine as hemiplegic migraines tend to affect the face and upper extremity more so than the lower extremity.  I also told her since this tingling in her left leg was new and different when compared to the episode she had back in September 2022, that we should admit her to the hospital for a TIA/CVA workup with MRI scan, echocardiogram etc.  The patient says that she does not want to stay.  Her headache is completely gone.  She has no symptoms at this time.  She says \"it is 4 July and we have Fourth of July plans with our 9-year-old daughter.\"  She says she feels completely fine.  She says the symptoms are very similar to those that she had back in September 2022 when she was diagnosed with complicated/complex migraine headache.  At this time she does not want to stay in the hospital.  She wants to leave.  I told the patient that at this time I cannot assure her that she did not have a " TIA or stroke.  I told her would be in her best interest to stay with us overnight to proceed with the TIA/stroke workup.  The patient does not want to be admitted.  She says she will come back to the ER if she has any recurrent symptoms.  She also would rather follow-up with her neurologist, Dr. Phillips this coming week and she says if Dr. Phillips feels that she needs additional workup, then she will go ahead and proceed with that.  For this reason patient will sign out AGAINST MEDICAL ADVICE.  She is awake and alert.  Her significant other is present at bedside and is witness to this discussion.  She is not intoxicated.  She is certainly capable of making own decisions.  She expresses her rationale for leaving.  She has been invited to return at any time if she changes her mind about admission or if she has any recurrent symptoms or becomes concerned in any way.  AMA form was signed and placed on the chart.    The patient is leaving against medical advice.  I discussed with the patient the risks of leaving without receiving appropriate care to include permanent disability or death.  I have discussed possible alternatives.  The patient is not intoxicated.  The patient is a capable decision maker and understands the risks and benefits of their decision.  While I certainly disagree with the patient's decision, I respect the patient's autonomy and will not keep them here against their will.  They understand that their decision to leave can be reversed at any time and they can return to us at any time for any reason at all.  Family is involved with the discussion and also understands my concern.  I have asked the nurses to have the patient sign an AMA form and to witness this signature.       DISPOSITION AND DISCUSSIONS  I have discussed management of the patient with the following physicians and ALTAGRACIA's: None    Discussion of management with other QHP or appropriate source(s): None     Escalation of care considered, and  ultimately not performed: Patient's visual disturbance had resolved upon arrival.  The weakness of her left arm had resolved upon arrival.  Most of the tingling of the left arm had resolved upon arrival (with the exception of some tingling to her fingertips).  Additionally, the tingling to her left leg had resolved upon arrival.  Symptoms have significantly improved.  NIH is 1 for some residual decree sensation to the left arm.  Thrombolytics were considered, but at this time she does not meet criteria for thrombolytics as her symptoms are significantly improved.    Barriers to care at this time, including but not limited to: None.     Decision tools and prescription drugs considered including, but not limited to: Pain Medications patient was given migraine cocktail here in the ER with complete resolution of her headache..    FINAL DIAGNOSIS  1. Paresthesia Acute   2. Other migraine without status migrainosus, not intractable Acute   3. Visual disturbance Acute   AMA     This dictation has been created using voice recognition software. The accuracy of the dictation is limited by the abilities of the software. I expect there may be some errors of grammar and possibly content. I made every attempt to manually correct the errors within my dictation. However, errors related to voice recognition software may still exist and should be interpreted within the appropriate context.     Electronically signed by: Gali Garcia M.D., 7/4/2025 12:00 PM           [1] No Known Allergies

## 2025-07-04 NOTE — ED TRIAGE NOTES
"Chief Complaint   Patient presents with    Numbness     Patient presents for left monocular vision changes as well as numbness and tingling to left arm. Seen three years ago for same complaint with diagnosis of migraines.      Patient reports exact episode occurred three years ago. States she was seen by a specialist and told she had \"migraines with aura.\" Report symptoms started 1.5 hours ago.     Charge Nurse called. Patient roomed.     "